# Patient Record
Sex: FEMALE | Race: WHITE | NOT HISPANIC OR LATINO | Employment: OTHER | URBAN - NONMETROPOLITAN AREA
[De-identification: names, ages, dates, MRNs, and addresses within clinical notes are randomized per-mention and may not be internally consistent; named-entity substitution may affect disease eponyms.]

---

## 2023-01-04 ENCOUNTER — HOSPITAL ENCOUNTER (OUTPATIENT)
Dept: RADIOLOGY | Facility: CLINIC | Age: 83
Discharge: HOME | End: 2023-01-04

## 2023-01-04 ENCOUNTER — OFFICE VISIT (OUTPATIENT)
Dept: OBGYN CLINIC | Facility: CLINIC | Age: 83
End: 2023-01-04

## 2023-01-04 VITALS
SYSTOLIC BLOOD PRESSURE: 124 MMHG | TEMPERATURE: 96.9 F | DIASTOLIC BLOOD PRESSURE: 68 MMHG | WEIGHT: 190 LBS | BODY MASS INDEX: 38.3 KG/M2 | HEART RATE: 61 BPM | HEIGHT: 59 IN

## 2023-01-04 DIAGNOSIS — S92.352A DISPLACED FRACTURE OF FIFTH METATARSAL BONE, LEFT FOOT, INITIAL ENCOUNTER FOR CLOSED FRACTURE: ICD-10-CM

## 2023-01-04 DIAGNOSIS — S92.352A DISPLACED FRACTURE OF FIFTH METATARSAL BONE, LEFT FOOT, INITIAL ENCOUNTER FOR CLOSED FRACTURE: Primary | ICD-10-CM

## 2023-01-04 RX ORDER — SENNA PLUS 8.6 MG/1
1 TABLET ORAL DAILY
COMMUNITY

## 2023-01-04 RX ORDER — ACETAMINOPHEN 120 MG/1
120 SUPPOSITORY RECTAL EVERY 4 HOURS PRN
COMMUNITY

## 2023-01-04 RX ORDER — POTASSIUM CHLORIDE 750 MG/1
TABLET, EXTENDED RELEASE ORAL
COMMUNITY
Start: 2022-12-01

## 2023-01-04 RX ORDER — BLOOD SUGAR DIAGNOSTIC
STRIP MISCELLANEOUS
COMMUNITY
Start: 2022-11-10

## 2023-01-04 RX ORDER — PEN NEEDLE, DIABETIC 29 G X1/2"
NEEDLE, DISPOSABLE MISCELLANEOUS
COMMUNITY
Start: 2022-11-21

## 2023-01-04 RX ORDER — INSULIN DEGLUDEC 100 U/ML
INJECTION, SOLUTION SUBCUTANEOUS
COMMUNITY

## 2023-01-04 RX ORDER — CALCIUM CARBONATE 200(500)MG
1 TABLET,CHEWABLE ORAL DAILY
COMMUNITY

## 2023-01-04 RX ORDER — ESCITALOPRAM OXALATE 10 MG/1
TABLET ORAL
COMMUNITY
Start: 2022-10-23

## 2023-01-04 RX ORDER — ACETAMINOPHEN 325 MG/1
650 TABLET ORAL EVERY 6 HOURS PRN
COMMUNITY

## 2023-01-04 RX ORDER — CARVEDILOL 3.12 MG/1
TABLET ORAL
COMMUNITY
Start: 2022-12-16

## 2023-01-04 RX ORDER — INSULIN LISPRO 100 [IU]/ML
INJECTION, SOLUTION INTRAVENOUS; SUBCUTANEOUS
COMMUNITY

## 2023-01-04 RX ORDER — LEVOTHYROXINE SODIUM 88 UG/1
TABLET ORAL
COMMUNITY
Start: 2022-12-16

## 2023-01-04 RX ORDER — TORSEMIDE 20 MG/1
TABLET ORAL
COMMUNITY
Start: 2022-11-16

## 2023-01-04 RX ORDER — CHOLECALCIFEROL (VITAMIN D3) 1250 MCG
CAPSULE ORAL
COMMUNITY

## 2023-01-04 RX ORDER — INSULIN ASPART 100 [IU]/ML
INJECTION, SUSPENSION SUBCUTANEOUS
COMMUNITY
Start: 2022-11-21

## 2023-01-04 RX ORDER — ASPIRIN 81 MG/1
81 TABLET, CHEWABLE ORAL DAILY
COMMUNITY

## 2023-01-04 RX ORDER — ROSUVASTATIN CALCIUM 40 MG/1
TABLET, COATED ORAL
COMMUNITY
Start: 2022-12-01

## 2023-01-04 NOTE — PROGRESS NOTES
ASSESSMENT/PLAN:    Problem List Items Addressed This Visit    None  Visit Diagnoses     Displaced fracture of fifth metatarsal bone, left foot, initial encounter for closed fracture    -  Primary    Relevant Orders    XR foot 3+ vw left          Plan: I would recommend follow-up in 3 to 4 weeks  X-rays of her foot will be obtained once again including AP, lateral and oblique views  She is to continue with the postop shoe  This may be removed for inactivity but should be worn whenever she is up and ambulating  The office should be contacted if questions or concerns arise  Return for 3 to 4 weeks  _____________________________________________________  CHIEF COMPLAINT:  Chief Complaint   Patient presents with   • Left Foot - Fracture         SUBJECTIVE:  Jeane  is a 81 y o  female who presents to the office today for initial evaluation of a left foot injury  The patient is a poor historian and unable to provide many details regarding her injury  The patient states that her injury occurred "weeks ago" while at home  She lives at home alone and typically ambulates with a walker, though admits that she may not have been using a walker when she tripped and fell from standing height  The patient is not able to describe the mechanism of injury  She states that after she fell she experienced pain in her left foot  Her daughter was there at the time, and was able to help the patient up  She was taken to [facility] where she was diagnosed with a fifth metatarsal fracture  The patient was discharged on 12/21/2022 to [facility] for acute rehab  Today the patient states that she is doing well  She denies any significant pain in her foot, but does note aching at night when in bed  The patient denies any numbness or tingling in the lower extremity  She has been utilizing a post-op shoe which was provided by [facility], and has been ambulating by placing her weight on her left heel   The patient states that she has never had a fracture in her foot before  When asked where the pain is located, the patient points along the lateral aspect of the foot, at the distal fifth metatarsal       PAST MEDICAL HISTORY:  Past Medical History:   Diagnosis Date   • Aortic valve stenosis    • Chronic diastolic congestive heart failure (HCC)    • Chronic embolism and thrombosis of deep vein of right upper extremity (HCC)    • Coronary atherosclerosis of native coronary artery    • Degenerative cervical disc    • Diabetes mellitus (HCC)    • DVT (deep venous thrombosis) (HCC)    • Hemiplegia and hemiparesis following cerebral infarction affecting left non-dominant side (HCC)    • Hyperlipemia    • Hypertensive heart and renal disease with congestive heart failure (HCC)    • Hypothyroidism    • Paroxysmal atrial fibrillation (HCC)    • Presence of xenogenic heart valve    • Stage 3b chronic kidney disease ([facility] [address] )        PAST SURGICAL HISTORY:  Past Surgical History:   Procedure Laterality Date   • AORTIC VALVE REPLACEMENT         FAMILY HISTORY:  History reviewed  No pertinent family history      SOCIAL HISTORY:  Social History     Tobacco Use   • Smoking status: Never     Passive exposure: Past   • Smokeless tobacco: Never   Vaping Use   • Vaping Use: Never used   Substance Use Topics   • Alcohol use: Never   • Drug use: Never       MEDICATIONS:    Current Outpatient Medications:   •  acetaminophen (Acephen) 120 mg suppository, Insert 120 mg into the rectum every 4 (four) hours as needed for fever, Disp: , Rfl:   •  acetaminophen (TYLENOL) 325 mg tablet, Take 650 mg by mouth every 6 (six) hours as needed for mild pain, Disp: , Rfl:   •  aspirin 81 mg chewable tablet, Chew 81 mg daily, Disp: , Rfl:   •  Bisacodyl (DULCOLAX RE), Insert into the rectum, Disp: , Rfl:   •  calcium carbonate (TUMS) 500 mg chewable tablet, Chew 1 tablet daily, Disp: , Rfl:   •  carvedilol (COREG) 3 125 mg tablet, , Disp: , Rfl:   • Cholecalciferol (Vitamin D3) 1 25 MG (38118 UT) CAPS, Take by mouth, Disp: , Rfl:   •  escitalopram (LEXAPRO) 10 mg tablet, , Disp: , Rfl:   •  Insulin Degludec Marlin Linda) 100 UNIT/ML SOLN, Inject under the skin, Disp: , Rfl:   •  insulin lispro (HumaLOG) 100 units/mL injection, Inject under the skin, Disp: , Rfl:   •  levothyroxine 88 mcg tablet, , Disp: , Rfl:   •  Magnesium Hydroxide (MILK OF MAGNESIA PO), Take by mouth, Disp: , Rfl:   •  NovoLOG Mix 70/30 FlexPen (70-30) 100 units/mL injection pen, , Disp: , Rfl:   •  OneTouch Verio test strip, , Disp: , Rfl:   •  oxyCODONE HCl 5 MG TABA, Take by mouth, Disp: , Rfl:   •  potassium chloride (K-DUR,KLOR-CON) 10 mEq tablet, , Disp: , Rfl:   •  rosuvastatin (CRESTOR) 40 MG tablet, , Disp: , Rfl:   •  senna (SENOKOT) 8 6 MG tablet, Take 1 tablet by mouth daily, Disp: , Rfl:   •  Sodium Phosphates (ENEMA RE), Insert into the rectum, Disp: , Rfl:   •  torsemide (DEMADEX) 20 mg tablet, , Disp: , Rfl:   •  UltiCare Mini Pen Needles 32G X 6 MM MISC, , Disp: , Rfl:     ALLERGIES:  Allergies   Allergen Reactions   • Lisinopril Cough   • Pioglitazone Cough   • Sulfa Antibiotics Throat Swelling   • Latex Rash       Review of systems:   Constitutional: Negative for fatigue, fever or loss of apetite  HENT: Negative  Respiratory: Negative for shortness of breath, dyspnea  Cardiovascular: Negative for chest pain/tightness  Gastrointestinal: Negative for abdominal pain, N/V  Endocrine: Negative for cold/heat intolerance, unexplained weight loss/gain  Genitourinary: Negative for flank pain, dysuria, hematuria  Musculoskeletal: left foot injury and pain as noted in HPI   Skin: Negative for rash  Neurological: negative for numbness or tingling  Psychiatric/Behavioral: Negative for agitation    _____________________________________________________  PHYSICAL EXAMINATION:    Blood pressure 124/68, pulse 61, temperature (!) 96 9 °F (36 1 °C), temperature source Temporal, height 4' 11" (1 499 m), weight 86 2 kg (190 lb)  General: well developed and well nourished, alert, oriented times 3 and appears comfortable  Psychiatric: Normal  HEENT: Benign  Cardiovascular: Regular    Pulmonary: No wheezing or stridor  Abdomen: Soft, Nontender  Skin: No masses, erythema, lacerations, fluctation, ulcerations  Neurovascular: strong distal pulses, sensory and motor testing intact    MUSCULOSKELETAL EXAMINATION:    Left foot:  - patient presents with post-op shoe and sock, which were removed without difficulty   - skin without lesions, ecchymosis, erythema, swelling, warmth, or other signs of infection  - no visible or palpable deformity  - the patient complains of tenderness along the distal third of the fifth metatarsal  No tenderness along the fifth toe, no tenderness along the 1st through 4th metatarsals  No tenderness at the ankle  - full active ROM of all digits and the ankle without complaint  - sensation intact to light touch along the DP/SP/SA/ECKERT/T nerve distribution  - strong pedal pulse  - brisk cap refill in all toes      _____________________________________________________  STUDIES REVIEWED:  I have personally reviewed pertinent films and reports in PACS  X-ray of the left foot taken today in office demonstrate the fracture to remain in acceptable alignment with no significant change from the original films  The original films were reviewed in the [facility] PACS system        Francoise Vicente